# Patient Record
Sex: FEMALE | Race: WHITE | NOT HISPANIC OR LATINO | Employment: UNEMPLOYED | URBAN - METROPOLITAN AREA
[De-identification: names, ages, dates, MRNs, and addresses within clinical notes are randomized per-mention and may not be internally consistent; named-entity substitution may affect disease eponyms.]

---

## 2022-06-11 PROCEDURE — 99285 EMERGENCY DEPT VISIT HI MDM: CPT | Mod: 25

## 2022-06-11 PROCEDURE — 96375 TX/PRO/DX INJ NEW DRUG ADDON: CPT

## 2022-06-11 PROCEDURE — 96361 HYDRATE IV INFUSION ADD-ON: CPT

## 2022-06-11 PROCEDURE — 96374 THER/PROPH/DIAG INJ IV PUSH: CPT

## 2022-06-12 ENCOUNTER — HOSPITAL ENCOUNTER (EMERGENCY)
Facility: HOSPITAL | Age: 42
Discharge: HOME | End: 2022-06-12
Attending: EMERGENCY MEDICINE
Payer: COMMERCIAL

## 2022-06-12 VITALS
WEIGHT: 213.63 LBS | HEIGHT: 64 IN | BODY MASS INDEX: 36.47 KG/M2 | SYSTOLIC BLOOD PRESSURE: 108 MMHG | RESPIRATION RATE: 15 BRPM | DIASTOLIC BLOOD PRESSURE: 64 MMHG | HEART RATE: 76 BPM | OXYGEN SATURATION: 100 % | TEMPERATURE: 98 F

## 2022-06-12 DIAGNOSIS — T67.5XXA HEAT EXHAUSTION, INITIAL ENCOUNTER: Primary | ICD-10-CM

## 2022-06-12 DIAGNOSIS — E86.0 DEHYDRATION: ICD-10-CM

## 2022-06-12 DIAGNOSIS — R07.9 CHEST PAIN: ICD-10-CM

## 2022-06-12 LAB
ALBUMIN SERPL BCP-MCNC: 4 G/DL (ref 3.5–5.2)
ALP SERPL-CCNC: 76 U/L (ref 55–135)
ALT SERPL W/O P-5'-P-CCNC: 14 U/L (ref 10–44)
AMPHET+METHAMPHET UR QL: NEGATIVE
ANION GAP SERPL CALC-SCNC: 9 MMOL/L (ref 8–16)
AST SERPL-CCNC: 15 U/L (ref 10–40)
B-HCG UR QL: NEGATIVE
BARBITURATES UR QL SCN>200 NG/ML: NEGATIVE
BASOPHILS # BLD AUTO: 0.08 K/UL (ref 0–0.2)
BASOPHILS NFR BLD: 0.8 % (ref 0–1.9)
BENZODIAZ UR QL SCN>200 NG/ML: NEGATIVE
BILIRUB SERPL-MCNC: 0.5 MG/DL (ref 0.1–1)
BILIRUB UR QL STRIP: NEGATIVE
BUN SERPL-MCNC: 19 MG/DL (ref 6–20)
BZE UR QL SCN: NEGATIVE
CALCIUM SERPL-MCNC: 9.5 MG/DL (ref 8.7–10.5)
CANNABINOIDS UR QL SCN: NEGATIVE
CHLORIDE SERPL-SCNC: 109 MMOL/L (ref 95–110)
CLARITY UR: CLEAR
CO2 SERPL-SCNC: 22 MMOL/L (ref 23–29)
COLOR UR: YELLOW
CREAT SERPL-MCNC: 0.9 MG/DL (ref 0.5–1.4)
CREAT UR-MCNC: 185.8 MG/DL (ref 15–325)
DIFFERENTIAL METHOD: ABNORMAL
EOSINOPHIL # BLD AUTO: 0.2 K/UL (ref 0–0.5)
EOSINOPHIL NFR BLD: 1.7 % (ref 0–8)
ERYTHROCYTE [DISTWIDTH] IN BLOOD BY AUTOMATED COUNT: 15.2 % (ref 11.5–14.5)
EST. GFR  (AFRICAN AMERICAN): >60 ML/MIN/1.73 M^2
EST. GFR  (NON AFRICAN AMERICAN): >60 ML/MIN/1.73 M^2
GLUCOSE SERPL-MCNC: 91 MG/DL (ref 70–110)
GLUCOSE UR QL STRIP: NEGATIVE
HCT VFR BLD AUTO: 39.5 % (ref 37–48.5)
HGB BLD-MCNC: 11.9 G/DL (ref 12–16)
HGB UR QL STRIP: NEGATIVE
IMM GRANULOCYTES # BLD AUTO: 0.03 K/UL (ref 0–0.04)
IMM GRANULOCYTES NFR BLD AUTO: 0.3 % (ref 0–0.5)
KETONES UR QL STRIP: NEGATIVE
LEUKOCYTE ESTERASE UR QL STRIP: NEGATIVE
LYMPHOCYTES # BLD AUTO: 3.6 K/UL (ref 1–4.8)
LYMPHOCYTES NFR BLD: 34.9 % (ref 18–48)
MCH RBC QN AUTO: 25.9 PG (ref 27–31)
MCHC RBC AUTO-ENTMCNC: 30.1 G/DL (ref 32–36)
MCV RBC AUTO: 86 FL (ref 82–98)
METHADONE UR QL SCN>300 NG/ML: NEGATIVE
MONOCYTES # BLD AUTO: 0.6 K/UL (ref 0.3–1)
MONOCYTES NFR BLD: 5.9 % (ref 4–15)
NEUTROPHILS # BLD AUTO: 5.8 K/UL (ref 1.8–7.7)
NEUTROPHILS NFR BLD: 56.4 % (ref 38–73)
NITRITE UR QL STRIP: NEGATIVE
NRBC BLD-RTO: 0 /100 WBC
OPIATES UR QL SCN: NEGATIVE
PCP UR QL SCN>25 NG/ML: NEGATIVE
PH UR STRIP: 6 [PH] (ref 5–8)
PLATELET # BLD AUTO: 248 K/UL (ref 150–450)
PMV BLD AUTO: 11.1 FL (ref 9.2–12.9)
POTASSIUM SERPL-SCNC: 3.8 MMOL/L (ref 3.5–5.1)
PROT SERPL-MCNC: 7.8 G/DL (ref 6–8.4)
PROT UR QL STRIP: NEGATIVE
RBC # BLD AUTO: 4.59 M/UL (ref 4–5.4)
SODIUM SERPL-SCNC: 140 MMOL/L (ref 136–145)
SP GR UR STRIP: >=1.03 (ref 1–1.03)
TOXICOLOGY INFORMATION: NORMAL
TROPONIN I SERPL DL<=0.01 NG/ML-MCNC: <0.006 NG/ML (ref 0–0.03)
URN SPEC COLLECT METH UR: ABNORMAL
UROBILINOGEN UR STRIP-ACNC: NEGATIVE EU/DL
WBC # BLD AUTO: 10.27 K/UL (ref 3.9–12.7)

## 2022-06-12 PROCEDURE — 81003 URINALYSIS AUTO W/O SCOPE: CPT | Mod: 59 | Performed by: NURSE PRACTITIONER

## 2022-06-12 PROCEDURE — 80053 COMPREHEN METABOLIC PANEL: CPT | Performed by: NURSE PRACTITIONER

## 2022-06-12 PROCEDURE — 81025 URINE PREGNANCY TEST: CPT | Performed by: NURSE PRACTITIONER

## 2022-06-12 PROCEDURE — 63600175 PHARM REV CODE 636 W HCPCS: Performed by: EMERGENCY MEDICINE

## 2022-06-12 PROCEDURE — 85025 COMPLETE CBC W/AUTO DIFF WBC: CPT | Performed by: NURSE PRACTITIONER

## 2022-06-12 PROCEDURE — 84484 ASSAY OF TROPONIN QUANT: CPT | Performed by: NURSE PRACTITIONER

## 2022-06-12 PROCEDURE — 80307 DRUG TEST PRSMV CHEM ANLYZR: CPT | Performed by: NURSE PRACTITIONER

## 2022-06-12 RX ORDER — TRAZODONE HYDROCHLORIDE 50 MG/1
50 TABLET ORAL NIGHTLY
COMMUNITY
Start: 2022-05-17

## 2022-06-12 RX ORDER — KETOROLAC TROMETHAMINE 30 MG/ML
15 INJECTION, SOLUTION INTRAMUSCULAR; INTRAVENOUS
Status: COMPLETED | OUTPATIENT
Start: 2022-06-12 | End: 2022-06-12

## 2022-06-12 RX ORDER — ONDANSETRON 2 MG/ML
8 INJECTION INTRAMUSCULAR; INTRAVENOUS
Status: COMPLETED | OUTPATIENT
Start: 2022-06-12 | End: 2022-06-12

## 2022-06-12 RX ADMIN — KETOROLAC TROMETHAMINE 15 MG: 30 INJECTION, SOLUTION INTRAMUSCULAR at 01:06

## 2022-06-12 RX ADMIN — SODIUM CHLORIDE, SODIUM LACTATE, POTASSIUM CHLORIDE, AND CALCIUM CHLORIDE 1000 ML: .6; .31; .03; .02 INJECTION, SOLUTION INTRAVENOUS at 03:06

## 2022-06-12 RX ADMIN — ONDANSETRON 8 MG: 2 INJECTION INTRAMUSCULAR; INTRAVENOUS at 01:06

## 2022-06-12 NOTE — ED PROVIDER NOTES
Encounter Date: 6/11/2022       History     Chief Complaint   Patient presents with    Abdominal Pain    Headache     Pt reports that she went to [place] today and got really hot too early on in the day. Pt states that since then she is experiencing some confusion, headache, abdominal pain, chest pain, dizziness, and sensitivity to light.      Cristel Nevarez is a 41 y.o. female who presents with 6-12 hrs of gradual onset, moderate dizziness with associated confusion, headache, abdominal pain, chest pain, and aversion to light after heavy intense heat exposure in [place] all day.  She has no other complaints.           Review of patient's allergies indicates:   Allergen Reactions    Pcn [penicillins] Anaphylaxis    Rocephin [ceftriaxone] Anaphylaxis    Pfizer covid-19 vaccine (eua) [covid-19 vacc,mrna(pfizer)(pf)]     Soma [carisoprodol] Rash     Past Medical History:   Diagnosis Date    Diabetes mellitus     Migraine      Past Surgical History:   Procedure Laterality Date    CARPAL TUNNEL RELEASE      DILATION AND CURETTAGE OF UTERUS      WISDOM TOOTH EXTRACTION       No family history on file.  Social History     Tobacco Use    Smoking status: Never Smoker   Substance Use Topics    Alcohol use: No    Drug use: No     Review of Systems   Constitutional: Negative.  Negative for fever.   HENT: Negative.    Eyes: Negative.    Respiratory: Negative.  Negative for cough.    Cardiovascular: Positive for chest pain.   Gastrointestinal: Negative.    Endocrine: Negative.    Genitourinary: Negative.    Musculoskeletal: Negative.    Skin: Negative.    Allergic/Immunologic: Negative.    Neurological: Negative.    Hematological: Negative.    Psychiatric/Behavioral: Negative.    All other systems reviewed and are negative.      Physical Exam     Initial Vitals [06/12/22 0007]   BP Pulse Resp Temp SpO2   110/63 80 18 98 °F (36.7 °C) 100 %      MAP       --         Physical Exam    Nursing note and  vitals reviewed.  Constitutional: She appears well-developed and well-nourished. No distress.   HENT:   Head: Normocephalic and atraumatic.   Mucous membranes dry    Eyes: Conjunctivae and EOM are normal. Pupils are equal, round, and reactive to light.   Neck: Neck supple.   Cardiovascular: Normal rate, regular rhythm, normal heart sounds and intact distal pulses.   Pulmonary/Chest: Breath sounds normal. No respiratory distress.   Abdominal: Abdomen is soft. She exhibits no distension. There is no abdominal tenderness.   Musculoskeletal:         General: Normal range of motion.      Cervical back: Neck supple.     Neurological: She is alert and oriented to person, place, and time. She has normal strength.   Skin: Skin is warm and dry. Capillary refill takes less than 2 seconds.   Psychiatric: She has a normal mood and affect.         ED Course   Procedures  Labs Reviewed   CBC W/ AUTO DIFFERENTIAL - Abnormal; Notable for the following components:       Result Value    Hemoglobin 11.9 (*)     MCH 25.9 (*)     MCHC 30.1 (*)     RDW 15.2 (*)     All other components within normal limits   COMPREHENSIVE METABOLIC PANEL - Abnormal; Notable for the following components:    CO2 22 (*)     All other components within normal limits   URINALYSIS, REFLEX TO URINE CULTURE - Abnormal; Notable for the following components:    Specific Gravity, UA >=1.030 (*)     All other components within normal limits    Narrative:     Specimen Source->Urine   TROPONIN I   PREGNANCY TEST, URINE RAPID    Narrative:     Specimen Source->Urine   DRUG SCREEN PANEL, URINE EMERGENCY    Narrative:     Specimen Source->Urine     EKG Readings: (Independently Interpreted)   EKG Interpretation by Emergency Physician: Lavell Velasquez MD  Rhythm: normal sinus  Rate: 66 bpm  No ST-T changes concerning for acute ischemia  Normal axis.   Normal intervals.           Imaging Results          X-Ray Chest PA And Lateral (In process)             Chest X-Ray,  Emergency Physician Interpretation: No acute pathology      Medications   lactated ringers bolus 1,000 mL (has no administration in time range)   ondansetron injection 8 mg (8 mg Intravenous Given 6/12/22 0151)   ketorolac injection 15 mg (15 mg Intravenous Given 6/12/22 0151)                     Recent Results (from the past 24 hour(s))   CBC auto differential    Collection Time: 06/12/22  1:37 AM   Result Value Ref Range    WBC 10.27 3.90 - 12.70 K/uL    RBC 4.59 4.00 - 5.40 M/uL    Hemoglobin 11.9 (L) 12.0 - 16.0 g/dL    Hematocrit 39.5 37.0 - 48.5 %    MCV 86 82 - 98 fL    MCH 25.9 (L) 27.0 - 31.0 pg    MCHC 30.1 (L) 32.0 - 36.0 g/dL    RDW 15.2 (H) 11.5 - 14.5 %    Platelets 248 150 - 450 K/uL    MPV 11.1 9.2 - 12.9 fL    Immature Granulocytes 0.3 0.0 - 0.5 %    Gran # (ANC) 5.8 1.8 - 7.7 K/uL    Immature Grans (Abs) 0.03 0.00 - 0.04 K/uL    Lymph # 3.6 1.0 - 4.8 K/uL    Mono # 0.6 0.3 - 1.0 K/uL    Eos # 0.2 0.0 - 0.5 K/uL    Baso # 0.08 0.00 - 0.20 K/uL    nRBC 0 0 /100 WBC    Gran % 56.4 38.0 - 73.0 %    Lymph % 34.9 18.0 - 48.0 %    Mono % 5.9 4.0 - 15.0 %    Eosinophil % 1.7 0.0 - 8.0 %    Basophil % 0.8 0.0 - 1.9 %    Differential Method Automated    Comprehensive metabolic panel    Collection Time: 06/12/22  1:37 AM   Result Value Ref Range    Sodium 140 136 - 145 mmol/L    Potassium 3.8 3.5 - 5.1 mmol/L    Chloride 109 95 - 110 mmol/L    CO2 22 (L) 23 - 29 mmol/L    Glucose 91 70 - 110 mg/dL    BUN 19 6 - 20 mg/dL    Creatinine 0.9 0.5 - 1.4 mg/dL    Calcium 9.5 8.7 - 10.5 mg/dL    Total Protein 7.8 6.0 - 8.4 g/dL    Albumin 4.0 3.5 - 5.2 g/dL    Total Bilirubin 0.5 0.1 - 1.0 mg/dL    Alkaline Phosphatase 76 55 - 135 U/L    AST 15 10 - 40 U/L    ALT 14 10 - 44 U/L    Anion Gap 9 8 - 16 mmol/L    eGFR if African American >60 >60 mL/min/1.73 m^2    eGFR if non African American >60 >60 mL/min/1.73 m^2   Troponin I    Collection Time: 06/12/22  1:37 AM   Result Value Ref Range    Troponin I <0.006 0.000 -  0.026 ng/mL   Urinalysis, Reflex to Urine Culture Urine, Clean Catch    Collection Time: 06/12/22  1:45 AM    Specimen: Urine   Result Value Ref Range    Specimen UA Urine, Clean Catch     Color, UA Yellow Yellow, Straw, Ardi    Appearance, UA Clear Clear    pH, UA 6.0 5.0 - 8.0    Specific Gravity, UA >=1.030 (A) 1.005 - 1.030    Protein, UA Negative Negative    Glucose, UA Negative Negative    Ketones, UA Negative Negative    Bilirubin (UA) Negative Negative    Occult Blood UA Negative Negative    Nitrite, UA Negative Negative    Urobilinogen, UA Negative <2.0 EU/dL    Leukocytes, UA Negative Negative   Pregnancy, urine rapid    Collection Time: 06/12/22  1:45 AM   Result Value Ref Range    Preg Test, Ur Negative    Drug screen panel, emergency    Collection Time: 06/12/22  1:45 AM   Result Value Ref Range    Benzodiazepines Negative Negative    Methadone metabolites Negative Negative    Cocaine (Metab.) Negative Negative    Opiate Scrn, Ur Negative Negative    Barbiturate Screen, Ur Negative Negative    Amphetamine Screen, Ur Negative Negative    THC Negative Negative    Phencyclidine Negative Negative    Creatinine, Urine 185.8 15.0 - 325.0 mg/dL    Toxicology Information SEE COMMENT        Patient's evaluation in the ED does not suggest any emergent or life threatening medical conditions requiring immediate intervention beyond what was provided in the ED, and I believe patient is safe for discharge.  Regardless, an unremarkable evaluation in the ED does not preclude the development or presence of a serious or life threatening condition. As such, patient was given return instructions for any change or worsening in symptoms.         Clinical Impression:   Final diagnoses:  [R07.9] Chest pain  [E86.0] Dehydration  [T67.5XXA] Heat exhaustion, initial encounter (Primary)          ED Disposition Condition    Discharge Stable        ED Prescriptions     None        Follow-up Information     Follow up With Specialties  Details Why Contact Info    with your primary care physician  Schedule an appointment as soon as possible for a visit       [facility] - Emergency Dept. Emergency Medicine  As needed, If symptoms worsen [address] [facility] [address] [phone]           Lavell Velasquez MD  06/12/22 3569

## 2022-06-12 NOTE — FIRST PROVIDER EVALUATION
"Medical screening exam completed.  I have conducted a focused provider triage encounter, findings are as follows:    Brief history of present illness:  Pt presents with c/o chest pain, shortness of breath, abdominal pain and nausea.  Was in the heat in [place] this morning when symptoms started.    Vitals:    06/12/22 0007   BP: 110/63   Pulse: 80   Resp: 18   Temp: 98 °F (36.7 °C)   TempSrc: Oral   SpO2: 100%   Weight: 96.9 kg (213 lb 10 oz)   Height: 5' 4" (1.626 m)       Pertinent physical exam:      Brief workup plan:      Preliminary workup initiated; this workup will be continued and followed by the physician or advanced practice provider that is assigned to the patient when roomed.  "

## 2023-04-04 LAB
ALBUMIN SERPL BCP-MCNC: 3.7 G/DL (ref 3.5–5.2)
ALP SERPL-CCNC: 97 U/L (ref 55–135)
ALT SERPL W/O P-5'-P-CCNC: 19 U/L (ref 10–44)
ANION GAP SERPL CALC-SCNC: 10 MMOL/L (ref 8–16)
AST SERPL-CCNC: 17 U/L (ref 10–40)
BASOPHILS # BLD AUTO: 0.07 K/UL (ref 0–0.2)
BASOPHILS NFR BLD: 0.8 % (ref 0–1.9)
BILIRUB SERPL-MCNC: 0.3 MG/DL (ref 0.1–1)
BUN SERPL-MCNC: 10 MG/DL (ref 6–20)
CALCIUM SERPL-MCNC: 9 MG/DL (ref 8.7–10.5)
CHLORIDE SERPL-SCNC: 110 MMOL/L (ref 95–110)
CO2 SERPL-SCNC: 22 MMOL/L (ref 23–29)
CREAT SERPL-MCNC: 0.8 MG/DL (ref 0.5–1.4)
DIFFERENTIAL METHOD: ABNORMAL
EOSINOPHIL # BLD AUTO: 0.2 K/UL (ref 0–0.5)
EOSINOPHIL NFR BLD: 2 % (ref 0–8)
ERYTHROCYTE [DISTWIDTH] IN BLOOD BY AUTOMATED COUNT: 15.9 % (ref 11.5–14.5)
EST. GFR  (NO RACE VARIABLE): >60 ML/MIN/1.73 M^2
GLUCOSE SERPL-MCNC: 96 MG/DL (ref 70–110)
HCT VFR BLD AUTO: 38.2 % (ref 37–48.5)
HGB BLD-MCNC: 12 G/DL (ref 12–16)
IMM GRANULOCYTES # BLD AUTO: 0.03 K/UL (ref 0–0.04)
IMM GRANULOCYTES NFR BLD AUTO: 0.3 % (ref 0–0.5)
LYMPHOCYTES # BLD AUTO: 3.1 K/UL (ref 1–4.8)
LYMPHOCYTES NFR BLD: 34.5 % (ref 18–48)
MCH RBC QN AUTO: 27.3 PG (ref 27–31)
MCHC RBC AUTO-ENTMCNC: 31.4 G/DL (ref 32–36)
MCV RBC AUTO: 87 FL (ref 82–98)
MONOCYTES # BLD AUTO: 0.6 K/UL (ref 0.3–1)
MONOCYTES NFR BLD: 7 % (ref 4–15)
NEUTROPHILS # BLD AUTO: 4.9 K/UL (ref 1.8–7.7)
NEUTROPHILS NFR BLD: 55.4 % (ref 38–73)
NRBC BLD-RTO: 0 /100 WBC
PLATELET # BLD AUTO: 264 K/UL (ref 150–450)
PMV BLD AUTO: 10.3 FL (ref 9.2–12.9)
POTASSIUM SERPL-SCNC: 4.4 MMOL/L (ref 3.5–5.1)
PROT SERPL-MCNC: 7.1 G/DL (ref 6–8.4)
RBC # BLD AUTO: 4.39 M/UL (ref 4–5.4)
SODIUM SERPL-SCNC: 142 MMOL/L (ref 136–145)
WBC # BLD AUTO: 8.92 K/UL (ref 3.9–12.7)

## 2023-04-04 PROCEDURE — 96375 TX/PRO/DX INJ NEW DRUG ADDON: CPT

## 2023-04-04 PROCEDURE — 80053 COMPREHEN METABOLIC PANEL: CPT | Performed by: PHYSICIAN ASSISTANT

## 2023-04-04 PROCEDURE — 96374 THER/PROPH/DIAG INJ IV PUSH: CPT

## 2023-04-04 PROCEDURE — 99285 EMERGENCY DEPT VISIT HI MDM: CPT | Mod: 25

## 2023-04-04 PROCEDURE — 85025 COMPLETE CBC W/AUTO DIFF WBC: CPT | Performed by: PHYSICIAN ASSISTANT

## 2023-04-04 PROCEDURE — 63600175 PHARM REV CODE 636 W HCPCS

## 2023-04-04 RX ORDER — KETOROLAC TROMETHAMINE 30 MG/ML
15 INJECTION, SOLUTION INTRAMUSCULAR; INTRAVENOUS
Status: COMPLETED | OUTPATIENT
Start: 2023-04-04 | End: 2023-04-04

## 2023-04-04 RX ORDER — ONDANSETRON 2 MG/ML
4 INJECTION INTRAMUSCULAR; INTRAVENOUS
Status: COMPLETED | OUTPATIENT
Start: 2023-04-04 | End: 2023-04-04

## 2023-04-04 RX ORDER — KETOROLAC TROMETHAMINE 30 MG/ML
INJECTION, SOLUTION INTRAMUSCULAR; INTRAVENOUS
Status: COMPLETED
Start: 2023-04-04 | End: 2023-04-04

## 2023-04-04 RX ORDER — ONDANSETRON 2 MG/ML
INJECTION INTRAMUSCULAR; INTRAVENOUS
Status: COMPLETED
Start: 2023-04-04 | End: 2023-04-04

## 2023-04-04 RX ADMIN — ONDANSETRON 4 MG: 2 INJECTION INTRAMUSCULAR; INTRAVENOUS at 09:04

## 2023-04-04 RX ADMIN — KETOROLAC TROMETHAMINE 15 MG: 30 INJECTION, SOLUTION INTRAMUSCULAR; INTRAVENOUS at 09:04

## 2023-04-05 ENCOUNTER — HOSPITAL ENCOUNTER (EMERGENCY)
Facility: HOSPITAL | Age: 43
Discharge: HOME | End: 2023-04-05
Attending: EMERGENCY MEDICINE
Payer: COMMERCIAL

## 2023-04-05 VITALS
RESPIRATION RATE: 20 BRPM | DIASTOLIC BLOOD PRESSURE: 65 MMHG | WEIGHT: 230 LBS | HEIGHT: 64 IN | TEMPERATURE: 98 F | OXYGEN SATURATION: 97 % | BODY MASS INDEX: 39.27 KG/M2 | HEART RATE: 99 BPM | SYSTOLIC BLOOD PRESSURE: 139 MMHG

## 2023-04-05 DIAGNOSIS — R10.32 LEFT LOWER QUADRANT ABDOMINAL PAIN: Primary | ICD-10-CM

## 2023-04-05 LAB
B-HCG UR QL: NEGATIVE
BILIRUB UR QL STRIP: NEGATIVE
CLARITY UR: CLEAR
COLOR UR: YELLOW
GLUCOSE UR QL STRIP: NEGATIVE
HGB UR QL STRIP: NEGATIVE
KETONES UR QL STRIP: NEGATIVE
LEUKOCYTE ESTERASE UR QL STRIP: NEGATIVE
NITRITE UR QL STRIP: NEGATIVE
PH UR STRIP: 6 [PH] (ref 5–8)
PROT UR QL STRIP: ABNORMAL
SP GR UR STRIP: >1.03 (ref 1–1.03)
URN SPEC COLLECT METH UR: ABNORMAL
UROBILINOGEN UR STRIP-ACNC: NEGATIVE EU/DL

## 2023-04-05 PROCEDURE — 63600175 PHARM REV CODE 636 W HCPCS: Performed by: EMERGENCY MEDICINE

## 2023-04-05 PROCEDURE — 96372 THER/PROPH/DIAG INJ SC/IM: CPT | Performed by: EMERGENCY MEDICINE

## 2023-04-05 PROCEDURE — 81025 URINE PREGNANCY TEST: CPT | Performed by: PHYSICIAN ASSISTANT

## 2023-04-05 PROCEDURE — 81003 URINALYSIS AUTO W/O SCOPE: CPT | Performed by: PHYSICIAN ASSISTANT

## 2023-04-05 RX ORDER — MORPHINE SULFATE 10 MG/ML
10 INJECTION INTRAMUSCULAR; INTRAVENOUS; SUBCUTANEOUS
Status: COMPLETED | OUTPATIENT
Start: 2023-04-05 | End: 2023-04-05

## 2023-04-05 RX ORDER — DIPHENHYDRAMINE HYDROCHLORIDE 50 MG/ML
25 INJECTION INTRAMUSCULAR; INTRAVENOUS
Status: DISCONTINUED | OUTPATIENT
Start: 2023-04-05 | End: 2023-04-05 | Stop reason: HOSPADM

## 2023-04-05 RX ADMIN — MORPHINE SULFATE 10 MG: 10 INJECTION, SOLUTION INTRAMUSCULAR; INTRAVENOUS at 02:04

## 2023-04-05 NOTE — ED PROVIDER NOTES
"SCRIBE #1 NOTE: I, Destin Olson and Vijay Rodríguez, am scribing for, and in the presence of, Mayank Eller MD. I have scribed the entire note.       History     Chief Complaint   Patient presents with    Abdominal Pain     Sudden severe LLQ pain, states keeping pressure on the site helps a little, hx of ovarian cysts. States she was fine two hours ago and takes medications to "stop hemorrhaging during her cycle" (ended last week) some abdominal discomfort recently.      Review of patient's allergies indicates:   Allergen Reactions    Pcn [penicillins] Anaphylaxis    Rocephin [ceftriaxone] Anaphylaxis    Pfizer covid-19 vaccine (eua) [covid-19 vacc,mrna(pfizer)(pf)]     Influenza virus vaccines Other (See Comments)     Severe flu like symptoms - intense generalized myalgias, malaise and fatigue  No site reaction, rash/hives, lip/tongue/throat swelling     Soma [carisoprodol] Rash         History of Present Illness     HPI    4/5/2023, 1:49 AM  History obtained from the patient      History of Present Illness: Cristel Nevarez is a 42 y.o. female patient with a PMHx of DM who presents to the Emergency Department for evaluation of LLQ pain which onset 4 hours PTA. Symptoms are constant and severe. Exacerbating factors include moving and laying down. No associated sxs reported. Patient denies any fever, n/v, CP, SOB, HA, and all other sxs at this time. Prior Tx includes Aleve with no relief. No further complaints or concerns at this time.       Arrival mode: Personal vehicle    PCP: Primary Doctor No        Past Medical History:  Past Medical History:   Diagnosis Date    Diabetes mellitus     Migraine        Past Surgical History:  Past Surgical History:   Procedure Laterality Date    CARPAL TUNNEL RELEASE      DILATION AND CURETTAGE OF UTERUS      WISDOM TOOTH EXTRACTION           Family History:  No family history on file.    Social History:  Social History     Tobacco Use    Smoking status: Never    Smokeless " tobacco: Not on file   Substance and Sexual Activity    Alcohol use: No    Drug use: No    Sexual activity: Not on file        Review of Systems     Review of Systems   Constitutional:  Negative for fever.   HENT:  Negative for sore throat.    Respiratory:  Negative for shortness of breath.    Cardiovascular:  Negative for chest pain.   Gastrointestinal:  Positive for abdominal pain (LLQ). Negative for nausea and vomiting.   Genitourinary:  Negative for dysuria.   Musculoskeletal:  Negative for back pain.   Skin:  Negative for rash.   Neurological:  Negative for weakness and headaches.   Hematological:  Does not bruise/bleed easily.   All other systems reviewed and are negative.     Physical Exam     Initial Vitals [04/04/23 2125]   BP Pulse Resp Temp SpO2   139/65 99 (!) 22 98.1 °F (36.7 °C) 97 %      MAP       --          Physical Exam  Nursing Notes and Vital Signs Reviewed.  Constitutional: Patient is in moderate distress. Well-developed and well-nourished.  Head: Atraumatic. Normocephalic.  Eyes: PERRL. EOM intact. Conjunctivae are not pale. No scleral icterus.  ENT: Mucous membranes are moist. Oropharynx is clear and symmetric.    Neck: Supple. Full ROM. No lymphadenopathy.  Cardiovascular: Regular rate. Regular rhythm. No murmurs, rubs, or gallops. Distal pulses are 2+ and symmetric.  Pulmonary/Chest: No respiratory distress. Clear to auscultation bilaterally. No wheezing or rales.  Abdominal: Soft and non-distended.  There is LLQ tenderness.  No rebound, guarding, or rigidity. Good bowel sounds.  Genitourinary: No CVA tenderness  Musculoskeletal: Moves all extremities. No obvious deformities. No edema. No calf tenderness.  Skin: Warm and dry.  Neurological:  Alert, awake, and appropriate.  Normal speech.  No acute focal neurological deficits are appreciated.  Psychiatric: Normal affect. Good eye contact. Appropriate in content.     ED Course   Procedures  ED Vital Signs:  Vitals:    04/04/23 2125 04/05/23  "0214   BP: 139/65    Pulse: 99    Resp: (!) 22 20   Temp: 98.1 °F (36.7 °C)    TempSrc: Oral    SpO2: 97%    Weight: 104.3 kg (230 lb)    Height: 5' 4" (1.626 m)        Abnormal Lab Results:  Labs Reviewed   CBC W/ AUTO DIFFERENTIAL - Abnormal; Notable for the following components:       Result Value    MCHC 31.4 (*)     RDW 15.9 (*)     All other components within normal limits   COMPREHENSIVE METABOLIC PANEL - Abnormal; Notable for the following components:    CO2 22 (*)     All other components within normal limits   URINALYSIS - Abnormal; Notable for the following components:    Specific Gravity, UA >1.030 (*)     Protein, UA Trace (*)     All other components within normal limits   PREGNANCY TEST, URINE RAPID    Narrative:     Specimen Source->Urine        All Lab Results:  Results for orders placed or performed during the hospital encounter of 04/05/23   CBC auto differential   Result Value Ref Range    WBC 8.92 3.90 - 12.70 K/uL    RBC 4.39 4.00 - 5.40 M/uL    Hemoglobin 12.0 12.0 - 16.0 g/dL    Hematocrit 38.2 37.0 - 48.5 %    MCV 87 82 - 98 fL    MCH 27.3 27.0 - 31.0 pg    MCHC 31.4 (L) 32.0 - 36.0 g/dL    RDW 15.9 (H) 11.5 - 14.5 %    Platelets 264 150 - 450 K/uL    MPV 10.3 9.2 - 12.9 fL    Immature Granulocytes 0.3 0.0 - 0.5 %    Gran # (ANC) 4.9 1.8 - 7.7 K/uL    Immature Grans (Abs) 0.03 0.00 - 0.04 K/uL    Lymph # 3.1 1.0 - 4.8 K/uL    Mono # 0.6 0.3 - 1.0 K/uL    Eos # 0.2 0.0 - 0.5 K/uL    Baso # 0.07 0.00 - 0.20 K/uL    nRBC 0 0 /100 WBC    Gran % 55.4 38.0 - 73.0 %    Lymph % 34.5 18.0 - 48.0 %    Mono % 7.0 4.0 - 15.0 %    Eosinophil % 2.0 0.0 - 8.0 %    Basophil % 0.8 0.0 - 1.9 %    Differential Method Automated    Comprehensive metabolic panel   Result Value Ref Range    Sodium 142 136 - 145 mmol/L    Potassium 4.4 3.5 - 5.1 mmol/L    Chloride 110 95 - 110 mmol/L    CO2 22 (L) 23 - 29 mmol/L    Glucose 96 70 - 110 mg/dL    BUN 10 6 - 20 mg/dL    Creatinine 0.8 0.5 - 1.4 mg/dL    Calcium 9.0 8.7 " - 10.5 mg/dL    Total Protein 7.1 6.0 - 8.4 g/dL    Albumin 3.7 3.5 - 5.2 g/dL    Total Bilirubin 0.3 0.1 - 1.0 mg/dL    Alkaline Phosphatase 97 55 - 135 U/L    AST 17 10 - 40 U/L    ALT 19 10 - 44 U/L    Anion Gap 10 8 - 16 mmol/L    eGFR >60 >60 mL/min/1.73 m^2   Pregnancy, urine rapid (UPT)   Result Value Ref Range    Preg Test, Ur Negative    Urinalysis   Result Value Ref Range    Specimen UA Urine, Clean Catch     Color, UA Yellow Yellow, Straw, Adri    Appearance, UA Clear Clear    pH, UA 6.0 5.0 - 8.0    Specific Gravity, UA >1.030 (A) 1.005 - 1.030    Protein, UA Trace (A) Negative    Glucose, UA Negative Negative    Ketones, UA Negative Negative    Bilirubin (UA) Negative Negative    Occult Blood UA Negative Negative    Nitrite, UA Negative Negative    Urobilinogen, UA Negative <2.0 EU/dL    Leukocytes, UA Negative Negative        Imaging Results:  Imaging Results              CT Abdomen Pelvis  Without Contrast (Final result)  Result time 04/05/23 07:21:43      Final result by Gatito Johnston MD (04/05/23 07:21:43)                   Impression:      1. No acute finding in the abdomen or pelvis.  2. Probable hemorrhagic or proteinaceous cyst in the right kidney.      Electronically signed by: Gatito Johnston  Date:    04/05/2023  Time:    07:21               Narrative:    EXAMINATION:  CT ABDOMEN PELVIS WITHOUT CONTRAST    CLINICAL HISTORY:  Abdominal pain, acute, nonlocalized;    COMPARISON:  None available.    TECHNIQUE:  Axial CT images were obtained of the abdomen and pelvis.  Iterative reconstruction technique was used. The CT exam was performed using one or more of the following dose reduction techniques- Automated exposure control, adjustment of the mA and/or kV according to patient size, and/or use of iterative reconstructed technique.    FINDINGS:  Heart: Normal in size. No pericardial effusion.    Lung Bases: Well aerated, without consolidation or pleural fluid.    Liver: Normal in size and  attenuation, with no focal hepatic lesions.    Gallbladder: No calcified gallstones.    Bile Ducts: No evidence of dilated ducts.    Pancreas: No mass or peripancreatic fat stranding.    Spleen: Unremarkable.    Adrenals: Unremarkable.    Kidneys/ Ureters: Right-sided renal lesion demonstrates high attenuation potentially reflecting hemorrhagic cyst measuring 1.4 cm.  There is an adjacent dystrophic calcification.  No hydronephrosis on either side.    Bladder: No evidence of wall thickening.    Reproductive organs: Unremarkable.    GI Tract/Mesentery: No evidence of bowel obstruction or inflammation.    Peritoneal Space: No ascites. No free air.    Retroperitoneum: No significant adenopathy.    Abdominal wall: Unremarkable.    Vasculature: No significant atherosclerosis or aneurysm.    Bones: No acute fracture.                                       US Pelvis Comp with Transvag NON-OB (xpd) (Final result)  Result time 04/04/23 23:00:04   Procedure changed from US Pelvis Complete Non OB     Final result by Sreedhar Del Valle MD (04/04/23 23:00:04)                   Impression:      Suboptimal evaluation.  Nonvisualization of the right ovary.    Left ovary is normal sized but with simple appearing cystic lesion. Venous flow cannot be established. Limited arterial flow identified. Torsion not excluded.  Correlation to symptoms and further evaluation as warranted.      Electronically signed by: Sreedhar Del Valle  Date:    04/04/2023  Time:    23:00               Narrative:    EXAMINATION:  US PELVIS COMP WITH TRANSVAG NON-OB (XPD)    CLINICAL HISTORY:  pelvic pain;    TECHNIQUE:  Transabdominal sonography of the pelvis was performed, followed by transvaginal sonography to better evaluate the uterus and ovaries.    COMPARISON:  None.    FINDINGS:  Uterus:    Size: 10.2 x 4.1 x 4.8 cm    Masses: 2.5 x 2.8 x 2.8 cm fibroid.    Endometrium: Normal in this pre menopausal patient, measuring 8 mm.    Right  ovary:    Nonvisualized    Left ovary:    Size: 3.2 x 3.2 x 2.2 cm    Appearance: Ovaries poorly visualized.  There is an associated 2.6 cm cyst.    Vascular Flow: Venous flow cannot be established.  Limited arterial flow identified.  Torsion not excluded.    Free Fluid:    None.                                     Type of Interpretation: Outside Written Report.  Radiology Procedure Done: Abdomen/Pelvis CT without Contrast.  Interpretation: No acute findings in the abdomen or pelvis.                   The Emergency Provider reviewed the vital signs and test results, which are outlined above.     ED Discussion     2:05 AM: Discussed pt's case with Dr. Walker (OBGYN) who says don't get torsion with a normal sized ovary.    3:36 AM: Reassessed pt at this time. Discussed with pt all pertinent ED information and results. Discussed pt dx and plan of tx. Gave pt all f/u and return to the ED instructions. All questions and concerns were addressed at this time. Pt expresses understanding of information and instructions, and is comfortable with plan to discharge. Pt is stable for discharge.    I discussed with patient and/or family/caretaker that evaluation in the ED does not suggest any emergent or life threatening medical conditions requiring immediate intervention beyond what was provided in the ED, and I believe patient is safe for discharge.  Regardless, an unremarkable evaluation in the ED does not preclude the development or presence of a serious of life threatening condition. As such, patient was instructed to return immediately for any worsening or change in current symptoms.         Medical Decision Making:   Clinical Tests:   Lab Tests: Ordered and Reviewed  Radiological Study: Ordered and Reviewed         ED Medication(s):  Medications   ketorolac injection 15 mg (15 mg Intravenous Given 4/4/23 2142)   ondansetron injection 4 mg (4 mg Intravenous Given 4/4/23 2142)   morphine injection 10 mg (10 mg Intramuscular  Given 4/5/23 0214)       Discharge Medication List as of 4/5/2023  3:38 AM           Follow-up Information       Michell Arzate MD In 1 day.    Specialty: Internal Medicine  Contact information:  [address] [facility]  [address]  [facility] [address] [phone]               [facility] - Emergency Dept..    Specialty: Emergency Medicine  Why: As needed, If symptoms worsen  Contact information:  [address] [facility] [address] [phone]                               Scribe Attestation:   Scribe #1: I performed the above scribed service and the documentation accurately describes the services I performed. I attest to the accuracy of the note.     Attending:   Physician Attestation Statement for Scribe #1: I, Mayank Eller MD, personally performed the services described in this documentation, as scribed by Destin Olson and Vijay Rodríguez, in my presence, and it is both accurate and complete.           Clinical Impression       ICD-10-CM ICD-9-CM   1. Left lower quadrant abdominal pain  R10.32 789.04       Disposition:   Disposition: Discharged  Condition: Stable       Mayank Eller MD  04/06/23 7193

## 2023-04-05 NOTE — FIRST PROVIDER EVALUATION
" Emergency Department TeleTriage Encounter Note      CHIEF COMPLAINT    Chief Complaint   Patient presents with    Abdominal Pain     Sudden severe LLQ pain, states keeping pressure on the site helps a little, hx of ovarian cysts. States she was fine two hours ago and takes medications to "stop hemorrhaging during her cycle" (ended last week) some abdominal discomfort recently.        VITAL SIGNS   Initial Vitals [04/04/23 2125]   BP Pulse Resp Temp SpO2   139/65 99 (!) 22 98.1 °F (36.7 °C) 97 %      MAP       --            ALLERGIES    Review of patient's allergies indicates:   Allergen Reactions    Pcn [penicillins] Anaphylaxis    Rocephin [ceftriaxone] Anaphylaxis    Pfizer covid-19 vaccine (eua) [covid-19 vacc,mrna(pfizer)(pf)]     Influenza virus vaccines Other (See Comments)     Severe flu like symptoms - intense generalized myalgias, malaise and fatigue  No site reaction, rash/hives, lip/tongue/throat swelling     Soma [carisoprodol] Rash       PROVIDER TRIAGE NOTE  This is a teletriage evaluation of a 42 y.o. female presenting to the ED complaining of left suprapubic pain that started suddenly while in shower.  Hx of ovarian cyst.     Initial orders will be placed and care will be transferred to an alternate provider when patient is roomed for a full evaluation. Any additional orders and the final disposition will be determined by that provider.       ORDERS  Labs Reviewed   CBC W/ AUTO DIFFERENTIAL   COMPREHENSIVE METABOLIC PANEL   PREGNANCY TEST, URINE RAPID   URINALYSIS       ED Orders (720h ago, onward)      Start Ordered     Status Ordering Provider    04/04/23 2145 04/04/23 2133  ketorolac injection 15 mg  ED 1 Time         Acknowledged DANIEL ELY    04/04/23 2145 04/04/23 2133  ondansetron injection 4 mg  ED 1 Time         Acknowledged DANIEL ELY    04/04/23 2135 04/04/23 2134  US Pelvis Complete Non OB  1 time imaging         Ordered DANIEL ELY    " 04/04/23 2133 04/04/23 2133  Saline lock IV  Once         Completed by DAYNE VAZQUEZ on 4/4/2023 at  9:34 PM DANIEL ELY    04/04/23 2133 04/04/23 2133  CBC auto differential  STAT         Acknowledged DANIEL ELY    04/04/23 2133 04/04/23 2133  Comprehensive metabolic panel  STAT         Acknowledged DANIEL ELY    04/04/23 2133 04/04/23 2133  Pregnancy, urine rapid (UPT)  Once         Acknowledged DANIEL ELY    04/04/23 2133 04/04/23 2133  Urinalysis  STAT         Acknowledged DANIEL ELY              Virtual Visit Note: The provider triage portion of this emergency department evaluation and documentation was performed via [employer], a HIPAA-compliant telemedicine application, in concert with a tele-presenter in the room. A face to face patient evaluation with one of my colleagues will occur once the patient is placed in an emergency department room.      DISCLAIMER: This note was prepared with [employer] voice recognition transcription software. Garbled syntax, mangled pronouns, and other bizarre constructions may be attributed to that software system.

## 2023-11-20 ENCOUNTER — TELEPHONE (OUTPATIENT)
Dept: SPORTS MEDICINE | Facility: CLINIC | Age: 43
End: 2023-11-20
Payer: COMMERCIAL

## 2023-11-20 DIAGNOSIS — M25.532 LEFT WRIST PAIN: Primary | ICD-10-CM

## 2023-11-22 ENCOUNTER — HOSPITAL ENCOUNTER (OUTPATIENT)
Dept: RADIOLOGY | Facility: HOSPITAL | Age: 43
Discharge: HOME | End: 2023-11-22
Attending: STUDENT IN AN ORGANIZED HEALTH CARE EDUCATION/TRAINING PROGRAM
Payer: COMMERCIAL

## 2023-11-22 ENCOUNTER — OFFICE VISIT (OUTPATIENT)
Dept: SPORTS MEDICINE | Facility: CLINIC | Age: 43
End: 2023-11-22
Payer: COMMERCIAL

## 2023-11-22 DIAGNOSIS — M25.532 LEFT WRIST PAIN: ICD-10-CM

## 2023-11-22 DIAGNOSIS — S63.502A SPRAIN AND STRAIN OF LEFT WRIST: ICD-10-CM

## 2023-11-22 DIAGNOSIS — S52.592A OTHER CLOSED FRACTURE OF DISTAL END OF LEFT RADIUS, INITIAL ENCOUNTER: Primary | ICD-10-CM

## 2023-11-22 DIAGNOSIS — S66.912A SPRAIN AND STRAIN OF LEFT WRIST: ICD-10-CM

## 2023-11-22 PROCEDURE — 73110 X-RAY EXAM OF WRIST: CPT | Mod: TC,FY,PO,LT

## 2023-11-22 PROCEDURE — 73110 X-RAY EXAM OF WRIST: CPT | Mod: 26,LT,, | Performed by: RADIOLOGY

## 2023-11-22 PROCEDURE — 25600 CLTX DST RDL FX/EPHYS SEP WO: CPT | Mod: LT,S$GLB,, | Performed by: STUDENT IN AN ORGANIZED HEALTH CARE EDUCATION/TRAINING PROGRAM

## 2023-11-22 PROCEDURE — 1159F MED LIST DOCD IN RCRD: CPT | Mod: CPTII,S$GLB,, | Performed by: STUDENT IN AN ORGANIZED HEALTH CARE EDUCATION/TRAINING PROGRAM

## 2023-11-22 PROCEDURE — 73110 XR WRIST COMPLETE 3 VIEWS LEFT: ICD-10-PCS | Mod: 26,LT,, | Performed by: RADIOLOGY

## 2023-11-22 PROCEDURE — 1160F RVW MEDS BY RX/DR IN RCRD: CPT | Mod: CPTII,S$GLB,, | Performed by: STUDENT IN AN ORGANIZED HEALTH CARE EDUCATION/TRAINING PROGRAM

## 2023-11-22 PROCEDURE — 99999 PR PBB SHADOW E&M-EST. PATIENT-LVL III: CPT | Mod: PBBFAC,,, | Performed by: STUDENT IN AN ORGANIZED HEALTH CARE EDUCATION/TRAINING PROGRAM

## 2023-11-22 PROCEDURE — 25600 PR CLOSED RX DIST RAD/ULNA FX: ICD-10-PCS | Mod: LT,S$GLB,, | Performed by: STUDENT IN AN ORGANIZED HEALTH CARE EDUCATION/TRAINING PROGRAM

## 2023-11-22 PROCEDURE — 99204 OFFICE O/P NEW MOD 45 MIN: CPT | Mod: 57,S$GLB,, | Performed by: STUDENT IN AN ORGANIZED HEALTH CARE EDUCATION/TRAINING PROGRAM

## 2023-11-22 PROCEDURE — 99999 PR PBB SHADOW E&M-EST. PATIENT-LVL III: ICD-10-PCS | Mod: PBBFAC,,, | Performed by: STUDENT IN AN ORGANIZED HEALTH CARE EDUCATION/TRAINING PROGRAM

## 2023-11-22 PROCEDURE — 1159F PR MEDICATION LIST DOCUMENTED IN MEDICAL RECORD: ICD-10-PCS | Mod: CPTII,S$GLB,, | Performed by: STUDENT IN AN ORGANIZED HEALTH CARE EDUCATION/TRAINING PROGRAM

## 2023-11-22 PROCEDURE — 99204 PR OFFICE/OUTPT VISIT, NEW, LEVL IV, 45-59 MIN: ICD-10-PCS | Mod: 57,S$GLB,, | Performed by: STUDENT IN AN ORGANIZED HEALTH CARE EDUCATION/TRAINING PROGRAM

## 2023-11-22 PROCEDURE — 1160F PR REVIEW ALL MEDS BY PRESCRIBER/CLIN PHARMACIST DOCUMENTED: ICD-10-PCS | Mod: CPTII,S$GLB,, | Performed by: STUDENT IN AN ORGANIZED HEALTH CARE EDUCATION/TRAINING PROGRAM

## 2023-11-22 RX ORDER — NEBIVOLOL 10 MG/1
1 TABLET ORAL DAILY
COMMUNITY
Start: 2023-08-04

## 2023-11-22 RX ORDER — VALACYCLOVIR HYDROCHLORIDE 1 G/1
TABLET, FILM COATED ORAL
COMMUNITY
Start: 2023-05-02

## 2023-11-22 RX ORDER — RIZATRIPTAN BENZOATE 10 MG/1
10 TABLET, ORALLY DISINTEGRATING ORAL DAILY PRN
COMMUNITY
Start: 2023-05-02 | End: 2024-10-03

## 2023-11-22 RX ORDER — ALPRAZOLAM 0.5 MG/1
TABLET ORAL
COMMUNITY

## 2023-11-22 RX ORDER — ONDANSETRON 8 MG/1
8 TABLET, ORALLY DISINTEGRATING ORAL EVERY 12 HOURS PRN
COMMUNITY

## 2023-11-22 RX ORDER — ESCITALOPRAM OXALATE 10 MG/1
10 TABLET ORAL EVERY MORNING
COMMUNITY
Start: 2023-06-07

## 2023-11-22 RX ORDER — HYDROXYZINE HYDROCHLORIDE 25 MG/1
TABLET, FILM COATED ORAL
COMMUNITY
Start: 2023-05-02

## 2023-11-22 RX ORDER — BUPROPION HYDROCHLORIDE 150 MG/1
150 TABLET, EXTENDED RELEASE ORAL
COMMUNITY
Start: 2023-11-06

## 2023-11-22 RX ORDER — TOPIRAMATE 50 MG/1
1 TABLET, FILM COATED ORAL 2 TIMES DAILY
COMMUNITY
Start: 2023-06-29

## 2023-11-22 RX ORDER — ERGOCALCIFEROL 1.25 MG/1
50000 CAPSULE ORAL
COMMUNITY
Start: 2023-08-07

## 2023-11-22 RX ORDER — ASPIRIN 81 MG/1
81 TABLET ORAL
COMMUNITY

## 2023-11-22 RX ORDER — IBUPROFEN 800 MG/1
800 TABLET ORAL EVERY 8 HOURS PRN
Qty: 30 TABLET | Refills: 0 | Status: SHIPPED | OUTPATIENT
Start: 2023-11-22 | End: 2023-12-06 | Stop reason: SDUPTHER

## 2023-11-22 RX ORDER — CLONAZEPAM 0.5 MG/1
0.5 TABLET ORAL DAILY PRN
COMMUNITY
Start: 2023-05-02

## 2023-11-22 RX ORDER — METFORMIN HYDROCHLORIDE 500 MG/1
1000 TABLET, EXTENDED RELEASE ORAL
COMMUNITY
Start: 2023-09-23

## 2023-11-22 NOTE — PROGRESS NOTES
Patient ID: Cristel Nevarez  YOB: 1980  MRN: [id number]    Chief Complaint: Pain and Injury of the Left Hand    Referred By: Previous patient    Occupation:       History of Present Illness: Cristel Nevarez is a right-hand dominant 43 y.o. female who presents today with Pain and Injury of the Left Hand    Patient reports to the clinic today with left wrist pain.  She states that 2 weeks ago she hit her wrist on a door frame while rushing.  She went to the Urgent care and got xrays and a wrist brace.  There was no fracture noted in the xray.  She reports the pain as achy and does notice swelling and weakness especially when grasping and with full wrist range of motion.  She states that the wrist brace does help with her pain and wears it all the time.  She has also tried tylenol, NSAIDs, and ice with some relief.      Past Medical History:   Past Medical History:   Diagnosis Date    Diabetes mellitus     Migraine      Past Surgical History:   Procedure Laterality Date    CARPAL TUNNEL RELEASE      DILATION AND CURETTAGE OF UTERUS      OVARY SURGERY      WISDOM TOOTH EXTRACTION       History reviewed. No pertinent family history.  Social History     Socioeconomic History    Marital status:    Tobacco Use    Smoking status: Never   Substance and Sexual Activity    Alcohol use: No    Drug use: No    Sexual activity: Yes     Partners: Female     Medication List with Changes/Refills   New Medications    IBUPROFEN (ADVIL,MOTRIN) 800 MG TABLET    Take 1 tablet (800 mg total) by mouth every 8 (eight) hours as needed for Pain.   Current Medications    ALPRAZOLAM (XANAX) 0.5 MG TABLET    alprazolam Take PRN No date recorded tablet No frequency recorded No route recorded No set duration recorded No set duration amount recorded suspended 0.5 mg    ASPIRIN (ECOTRIN) 81 MG EC TABLET    Take 81 mg by mouth.    BUPROPION (WELLBUTRIN SR) 150 MG TBSR 12 HR TABLET    Take 150 mg  by mouth.    CLONAZEPAM (KLONOPIN) 0.5 MG TABLET    Take 0.5 mg by mouth daily as needed.    ERGOCALCIFEROL (ERGOCALCIFEROL) 50,000 UNIT CAP    Take 50,000 Units by mouth.    ESCITALOPRAM OXALATE (LEXAPRO) 10 MG TABLET    Take 10 mg by mouth every morning.    HYDROXYZINE HCL (ATARAX) 25 MG TABLET    As Needed    METFORMIN (GLUCOPHAGE-XR) 500 MG ER 24HR TABLET    Take 1,000 mg by mouth.    NALTREXONE-BUPROPION (CONTRAVE) 8-90 MG TBSR    2 tablets.    NEBIVOLOL (BYSTOLIC) 10 MG TAB    Take 1 tablet by mouth once daily.    ONDANSETRON (ZOFRAN-ODT) 8 MG TBDL    Take 8 mg by mouth every 12 (twelve) hours as needed.    RIZATRIPTAN (MAXALT-MLT) 10 MG DISINTEGRATING TABLET    Take 10 mg by mouth daily as needed.    TOPIRAMATE (TOPAMAX) 50 MG TABLET    Take 1 tablet by mouth 2 (two) times daily.    TRAZODONE (DESYREL) 50 MG TABLET    Take 50 mg by mouth nightly.    VALACYCLOVIR (VALTREX) 1000 MG TABLET    As Needed     Review of patient's allergies indicates:   Allergen Reactions    Pcn [penicillins] Anaphylaxis    Rocephin [ceftriaxone] Anaphylaxis    Pfizer covid-19 vaccine (eua) [covid-19 vacc,mrna(pfizer)(pf)]     Influenza virus vaccines Other (See Comments)     Severe flu like symptoms - intense generalized myalgias, malaise and fatigue  No site reaction, rash/hives, lip/tongue/throat swelling     Soma [carisoprodol] Rash       Physical Exam:   There is no height or weight on file to calculate BMI.    Physical Exam  Detailed MSK exam:   Ortho/SPM Exam     Left Wrist:  Inspection: Mild swelling in radial wrist  Palpation:  Point bony tenderness at distal radius, mild snuffbox tenderness  Range of motion: 60 deg Flexion         60 deg Extension         90 deg Supination         90 deg Pronation    Strength:  5/5 Flexion    5/5 Extension    5/5 Supination     5/5 Pronation  N/V Exam:  Radial: Normal motor (EPL/thumbs up)              Normal sensory (dorsal hand)   Median: Normal motor (FPL/A-OK)      Normal sensory  (thumb)   Ulnar:  Normal motor (Interossei/scissors-spread)     Normal sensory (5th finger)   LABC: Normal sensory (lateral forearm)   MABC: Normal sensory (medial forearm)   MC: Normal motor (elbow flexion)   Axillary: Normal motor/sensory (deltoid)  Normal radial and ulnar pulses, warm and well perfused with capillary refill < 2 sec       Imaging:  X-Ray Wrist Complete Left  Narrative: EXAM:  XR WRIST COMPLETE 3 VIEWS LEFT    CLINICAL HISTORY:  Left wrist pain.    FINDINGS: 4 views left wrist.  No comparison         No fracture is identified.  Joint alignment is anatomic.  Joint spaces appear relatively well maintained.  Small radiopacity seen on the palmar surface of the distal forearm of unknown clinical significance but could reflect a small foreign body.  Mild soft tissue swelling suspected.  Impression:   No acute radiographic abnormality of the left wrist.    Finalized on: 11/22/2023 8:35 AM By:  Dominick Pacheco MD  [facility]# [id number]      2023-11-22 08:[id number]    [facility]      Relevant imaging results were reviewed and interpreted by me and per my read:  Small cortical irregularity at the lateral distal radial styloid, without any intra-articular extension, or displacement.  Normal alignment of the wrist.  No scapholunate dissociation.  No significant degenerative changes.  Small radiopacity in the superficial soft tissue of the volar forearm.    This was discussed with the patient and / or family today.     Patient Instructions   Assessment:  Cristel Nevarez is a 43 y.o. female with a chief complaint of Pain and Injury of the Left Hand    Encounter Diagnoses   Name Primary?    Other closed fracture of distal end of left radius, initial encounter Yes    Sprain and strain of left wrist     Left wrist pain       Plan:  XR reviewed - small cortical irregularity at the lateral distal radius, possible hairline fracture; no obvious scaphoid abnormalities; no evidence of ligamentous disruption  Labs reviewed - A1c  5.1%, Cr 0.7, , LFTs WNL  History and clinical exam is suggestive of acute left wrist pain due to fairly significant contusion/impact, with significant point tenderness at the distal radius corresponding to small cortical irregularity on radiographs, consistent with a small, hairline fracture.  Also with some strain/sprain pattern pain.    Diagnosis, treatment options, prognosis discussed in detail.    Recommend continued wrist immobilization, for at least the next 2 weeks.    Switched to a more robust, a thumb spica brace today for better immobilization and support.  Keep on at night and throughout the day, removing only for hygiene, and to ice the wrist.  At least 10 minutes were spent sizing, fitting, and educating regarding durable medical equipment today by clinical assistant under direction of Pardeep Jerry MD.  Prescription for ibuprofen 800 mg.  Take 1 tablet, every 6-8 hours, as needed for pain and discomfort.    Alternate with Tylenol for breakthrough pain.    Recommend ice the wrist once or twice a day, for 10-15 minutes, using a barrier between ice and the skin to prevent prospect.    Follow-up:  2 weeks or sooner if there are any problems between now and then.    Thank you for choosing [facility] and Dr. Pardeep Jerry for your orthopedic & sports medicine care. It is our goal to provide you with exceptional care that will help keep you healthy, active, and get you back in the game.    Please do not hesitate to reach out to us via email, phone, or MyChart with any questions, concerns, or feedback.    If you are experiencing pain/discomfort ,or have questions after 5pm and would like to be connected to the [facility] on-call team, please call this number and specify which Sports Medicine provider is treating you: [phone]       A copy of today's visit note has been sent to the referring provider.           Pardeep Jerry  MD  Primary Care Sports Medicine    Disclaimer: This note was prepared using a voice recognition system and is likely to have sound alike errors within the text.

## 2023-11-22 NOTE — PATIENT INSTRUCTIONS
Assessment:  Cristel Nevarez is a 43 y.o. female with a chief complaint of Pain and Injury of the Left Hand    Encounter Diagnoses   Name Primary?    Other closed fracture of distal end of left radius, initial encounter Yes    Sprain and strain of left wrist     Left wrist pain       Plan:  XR reviewed - small cortical irregularity at the lateral distal radius, possible hairline fracture; no obvious scaphoid abnormalities; no evidence of ligamentous disruption  Labs reviewed - A1c 5.1%, Cr 0.7, , LFTs WNL  History and clinical exam is suggestive of acute left wrist pain due to fairly significant contusion/impact, with significant point tenderness at the distal radius corresponding to small cortical irregularity on radiographs, consistent with a small, hairline fracture.  Also with some strain/sprain pattern pain.    Diagnosis, treatment options, prognosis discussed in detail.    Recommend continued wrist immobilization, for at least the next 2 weeks.    Switched to a more robust, a thumb spica brace today for better immobilization and support.  Keep on at night and throughout the day, removing only for hygiene, and to ice the wrist.  At least 10 minutes were spent sizing, fitting, and educating regarding durable medical equipment today by clinical assistant under direction of Pardeep Jerry MD.  Prescription for ibuprofen 800 mg.  Take 1 tablet, every 6-8 hours, as needed for pain and discomfort.    Alternate with Tylenol for breakthrough pain.    Recommend ice the wrist once or twice a day, for 10-15 minutes, using a barrier between ice and the skin to prevent prospect.    Follow-up:  2 weeks or sooner if there are any problems between now and then.    Thank you for choosing [facility] and Dr. Pardeep Jerry for your orthopedic & sports medicine care. It is our goal to provide you with exceptional care that will help keep you healthy, active, and get you back in the  game.    Please do not hesitate to reach out to us via email, phone, or MyChart with any questions, concerns, or feedback.    If you are experiencing pain/discomfort ,or have questions after 5pm and would like to be connected to the [facility] [place] [facility] on-call team, please call this number and specify which Sports Medicine provider is treating you: [phone]

## 2023-12-06 ENCOUNTER — TELEPHONE (OUTPATIENT)
Dept: SPORTS MEDICINE | Facility: CLINIC | Age: 43
End: 2023-12-06

## 2023-12-06 ENCOUNTER — OFFICE VISIT (OUTPATIENT)
Dept: SPORTS MEDICINE | Facility: CLINIC | Age: 43
End: 2023-12-06
Payer: COMMERCIAL

## 2023-12-06 DIAGNOSIS — M65.4 DE QUERVAIN'S TENOSYNOVITIS, LEFT: Primary | ICD-10-CM

## 2023-12-06 DIAGNOSIS — M25.532 LEFT WRIST PAIN: ICD-10-CM

## 2023-12-06 PROCEDURE — 20550 NJX 1 TENDON SHEATH/LIGAMENT: CPT | Mod: 79,LT,S$GLB, | Performed by: STUDENT IN AN ORGANIZED HEALTH CARE EDUCATION/TRAINING PROGRAM

## 2023-12-06 PROCEDURE — 99214 OFFICE O/P EST MOD 30 MIN: CPT | Mod: 25,S$GLB,, | Performed by: STUDENT IN AN ORGANIZED HEALTH CARE EDUCATION/TRAINING PROGRAM

## 2023-12-06 PROCEDURE — 99999 PR PBB SHADOW E&M-EST. PATIENT-LVL III: CPT | Mod: PBBFAC,,, | Performed by: STUDENT IN AN ORGANIZED HEALTH CARE EDUCATION/TRAINING PROGRAM

## 2023-12-06 PROCEDURE — 1159F PR MEDICATION LIST DOCUMENTED IN MEDICAL RECORD: ICD-10-PCS | Mod: CPTII,S$GLB,, | Performed by: STUDENT IN AN ORGANIZED HEALTH CARE EDUCATION/TRAINING PROGRAM

## 2023-12-06 PROCEDURE — 20550 TENDON SHEATH: ICD-10-PCS | Mod: 79,LT,S$GLB, | Performed by: STUDENT IN AN ORGANIZED HEALTH CARE EDUCATION/TRAINING PROGRAM

## 2023-12-06 PROCEDURE — 99214 PR OFFICE/OUTPT VISIT, EST, LEVL IV, 30-39 MIN: ICD-10-PCS | Mod: 25,S$GLB,, | Performed by: STUDENT IN AN ORGANIZED HEALTH CARE EDUCATION/TRAINING PROGRAM

## 2023-12-06 PROCEDURE — 99999 PR PBB SHADOW E&M-EST. PATIENT-LVL III: ICD-10-PCS | Mod: PBBFAC,,, | Performed by: STUDENT IN AN ORGANIZED HEALTH CARE EDUCATION/TRAINING PROGRAM

## 2023-12-06 PROCEDURE — 76942 TENDON SHEATH: ICD-10-PCS | Mod: S$GLB,,, | Performed by: STUDENT IN AN ORGANIZED HEALTH CARE EDUCATION/TRAINING PROGRAM

## 2023-12-06 PROCEDURE — 76942 ECHO GUIDE FOR BIOPSY: CPT | Mod: S$GLB,,, | Performed by: STUDENT IN AN ORGANIZED HEALTH CARE EDUCATION/TRAINING PROGRAM

## 2023-12-06 PROCEDURE — 1160F RVW MEDS BY RX/DR IN RCRD: CPT | Mod: CPTII,S$GLB,, | Performed by: STUDENT IN AN ORGANIZED HEALTH CARE EDUCATION/TRAINING PROGRAM

## 2023-12-06 PROCEDURE — 1159F MED LIST DOCD IN RCRD: CPT | Mod: CPTII,S$GLB,, | Performed by: STUDENT IN AN ORGANIZED HEALTH CARE EDUCATION/TRAINING PROGRAM

## 2023-12-06 PROCEDURE — 1160F PR REVIEW ALL MEDS BY PRESCRIBER/CLIN PHARMACIST DOCUMENTED: ICD-10-PCS | Mod: CPTII,S$GLB,, | Performed by: STUDENT IN AN ORGANIZED HEALTH CARE EDUCATION/TRAINING PROGRAM

## 2023-12-06 RX ORDER — BETAMETHASONE SODIUM PHOSPHATE AND BETAMETHASONE ACETATE 3; 3 MG/ML; MG/ML
6 INJECTION, SUSPENSION INTRA-ARTICULAR; INTRALESIONAL; INTRAMUSCULAR; SOFT TISSUE
Status: DISCONTINUED | OUTPATIENT
Start: 2023-12-06 | End: 2023-12-06 | Stop reason: HOSPADM

## 2023-12-06 RX ORDER — IBUPROFEN 800 MG/1
800 TABLET ORAL EVERY 8 HOURS PRN
Qty: 30 TABLET | Refills: 0 | Status: SHIPPED | OUTPATIENT
Start: 2023-12-06

## 2023-12-06 RX ADMIN — BETAMETHASONE SODIUM PHOSPHATE AND BETAMETHASONE ACETATE 6 MG: 3; 3 INJECTION, SUSPENSION INTRA-ARTICULAR; INTRALESIONAL; INTRAMUSCULAR; SOFT TISSUE at 08:12

## 2023-12-06 NOTE — PROGRESS NOTES
Patient ID: Cristel Nevarez  YOB: 1980  MRN: [id number]    Chief Complaint: Injury of the Left Wrist    Referred By: Previous patient    Occupation:       History of Present Illness: Cristel Nevarez is a right-hand dominant 43 y.o. female who presents today with Injury of the Left Wrist    She was initially evaluated in our office on 11/22/23.   She was diagnosed with left wrist sprain with possible subtle nondisplaced distal radius fracture and treated with a thumb spica brace and ibuprofen 800mg.    Today, she reports no improvement in her wrist pain.  She is been using her wrist brace diligently, but still having significant radial sided wrist pain.  Has noticed pain with extreme flexion of the thumb as well as extreme extension of the thumb.      HPI 11/22/23:  Patient reports to the clinic today with left wrist pain.  She states that 2 weeks ago she hit her wrist on a door frame while rushing.  She went to the Urgent care and got xrays and a wrist brace.  There was no fracture noted in the xray.  She reports the pain as achy and does notice swelling and weakness especially when grasping and with full wrist range of motion.  She states that the wrist brace does help with her pain and wears it all the time.  She has also tried tylenol, NSAIDs, and ice with some relief.      Past Medical History:   Past Medical History:   Diagnosis Date    Diabetes mellitus     Migraine      Past Surgical History:   Procedure Laterality Date    CARPAL TUNNEL RELEASE      DILATION AND CURETTAGE OF UTERUS      OVARY SURGERY      WISDOM TOOTH EXTRACTION       History reviewed. No pertinent family history.  Social History     Socioeconomic History    Marital status:    Tobacco Use    Smoking status: Never   Substance and Sexual Activity    Alcohol use: No    Drug use: No    Sexual activity: Yes     Partners: Female     Medication List with Changes/Refills   Current Medications     ALPRAZOLAM (XANAX) 0.5 MG TABLET    alprazolam Take PRN No date recorded tablet No frequency recorded No route recorded No set duration recorded No set duration amount recorded suspended 0.5 mg    ASPIRIN (ECOTRIN) 81 MG EC TABLET    Take 81 mg by mouth.    BUPROPION (WELLBUTRIN SR) 150 MG TBSR 12 HR TABLET    Take 150 mg by mouth.    CLONAZEPAM (KLONOPIN) 0.5 MG TABLET    Take 0.5 mg by mouth daily as needed.    ERGOCALCIFEROL (ERGOCALCIFEROL) 50,000 UNIT CAP    Take 50,000 Units by mouth.    ESCITALOPRAM OXALATE (LEXAPRO) 10 MG TABLET    Take 10 mg by mouth every morning.    HYDROXYZINE HCL (ATARAX) 25 MG TABLET    As Needed    IBUPROFEN (ADVIL,MOTRIN) 800 MG TABLET    Take 1 tablet (800 mg total) by mouth every 8 (eight) hours as needed for Pain.    METFORMIN (GLUCOPHAGE-XR) 500 MG ER 24HR TABLET    Take 1,000 mg by mouth.    NALTREXONE-BUPROPION (CONTRAVE) 8-90 MG TBSR    2 tablets.    NEBIVOLOL (BYSTOLIC) 10 MG TAB    Take 1 tablet by mouth once daily.    ONDANSETRON (ZOFRAN-ODT) 8 MG TBDL    Take 8 mg by mouth every 12 (twelve) hours as needed.    RIZATRIPTAN (MAXALT-MLT) 10 MG DISINTEGRATING TABLET    Take 10 mg by mouth daily as needed.    TOPIRAMATE (TOPAMAX) 50 MG TABLET    Take 1 tablet by mouth 2 (two) times daily.    TRAZODONE (DESYREL) 50 MG TABLET    Take 50 mg by mouth nightly.    VALACYCLOVIR (VALTREX) 1000 MG TABLET    As Needed     Review of patient's allergies indicates:   Allergen Reactions    Pcn [penicillins] Anaphylaxis    Rocephin [ceftriaxone] Anaphylaxis    Pfizer covid-19 vaccine (eua) [covid-19 vacc,mrna(pfizer)(pf)]     Influenza virus vaccines Other (See Comments)     Severe flu like symptoms - intense generalized myalgias, malaise and fatigue  No site reaction, rash/hives, lip/tongue/throat swelling     Soma [carisoprodol] Rash       Physical Exam:   There is no height or weight on file to calculate BMI.    Physical Exam  Detailed MSK exam:   Ortho/SPM Exam     Left  Wrist:  Inspection: Mild swelling in radial wrist  Palpation:  Point bony tenderness at distal radius, mild snuffbox tenderness  Range of motion: 60 deg Flexion         60 deg Extension         90 deg Supination         90 deg Pronation    Strength:  5/5 Flexion    5/5 Extension    5/5 Supination     5/5 Pronation  Special tests:  Positive Finkelstein's  N/V Exam:  Radial: Normal motor (EPL/thumbs up)              Normal sensory (dorsal hand)   Median: Normal motor (FPL/A-OK)      Normal sensory (thumb)   Ulnar:  Normal motor (Interossei/scissors-spread)     Normal sensory (5th finger)   LABC: Normal sensory (lateral forearm)   MABC: Normal sensory (medial forearm)   MC: Normal motor (elbow flexion)   Axillary: Normal motor/sensory (deltoid)  Normal radial and ulnar pulses, warm and well perfused with capillary refill < 2 sec       Imaging:    No new imaging today.     Patient Instructions   Assessment:  Cristel Nevarez is a 43 y.o. female with a chief complaint of Injury of the Left Wrist    Encounter Diagnoses   Name Primary?    Left wrist pain     De Quervain's tenosynovitis, left Yes      Plan:  Unfortunately, patient has not had much improvement in her symptoms in the past 2 weeks.  Still with quite a bit of radial sided wrist pain today.  More consistent with de Quervain's tenosynovitis, with positive Finkelstein's testing, and effusion and pain with sonopalpation.    We will treat presumptively, with corticosteroid injection to the left 1st dorsal extensor compartment tendon sheath today.    Proper protocols after the injection included: no submerging pools, baths tubs, or hot tubs for 24 hr.  Showering is okay today.  Side effects of the corticosteroid injection can include elevated blood glucose levels and blood pressures, so if you are taking medications for these, please monitor closely, and contact your PCP if any issues.  Red flag symptoms include fever, chills, nausea, vomiting, red, warm,  tender joint at the area of injection.  If you are noticing these symptoms, they may be indicative of an infection, and please seek medical care immediately, either by calling our clinic or going to the emergency room.  Continue to wear the thumb spica wrist brace for the next week or so, then can discontinue if starting to feel better.    If not improving, we will obtain MRI  Can continue ibuprofen 800 mg, as needed    Follow-up:  As needed or sooner if there are any problems between now and then.    Thank you for choosing [facility] and Dr. Pardeep Jerry for your orthopedic & sports medicine care. It is our goal to provide you with exceptional care that will help keep you healthy, active, and get you back in the game.    Please do not hesitate to reach out to us via email, phone, or MyChart with any questions, concerns, or feedback.    If you are experiencing pain/discomfort ,or have questions after 5pm and would like to be connected to the [facility] on-call team, please call this number and specify which Sports Medicine provider is treating you: [phone]       A copy of today's visit note has been sent to the referring provider.           Pardeep Jerry MD  Primary Care Sports Medicine    Disclaimer: This note was prepared using a voice recognition system and is likely to have sound alike errors within the text.

## 2023-12-06 NOTE — PATIENT INSTRUCTIONS
Assessment:  Cristel Nevarez is a 43 y.o. female with a chief complaint of Injury of the Left Wrist    Encounter Diagnoses   Name Primary?    Left wrist pain     De Quervain's tenosynovitis, left Yes      Plan:  Unfortunately, patient has not had much improvement in her symptoms in the past 2 weeks.  Still with quite a bit of radial sided wrist pain today.  More consistent with de Quervain's tenosynovitis, with positive Finkelstein's testing, and effusion and pain with sonopalpation.    We will treat presumptively, with corticosteroid injection to the left 1st dorsal extensor compartment tendon sheath today.    Proper protocols after the injection included: no submerging pools, baths tubs, or hot tubs for 24 hr.  Showering is okay today.  Side effects of the corticosteroid injection can include elevated blood glucose levels and blood pressures, so if you are taking medications for these, please monitor closely, and contact your PCP if any issues.  Red flag symptoms include fever, chills, nausea, vomiting, red, warm, tender joint at the area of injection.  If you are noticing these symptoms, they may be indicative of an infection, and please seek medical care immediately, either by calling our clinic or going to the emergency room.  Continue to wear the thumb spica wrist brace for the next week or so, then can discontinue if starting to feel better.    If not improving, we will obtain MRI  Can continue ibuprofen 800 mg, as needed    Follow-up:  As needed or sooner if there are any problems between now and then.    Thank you for choosing [facility] and Dr. Pardeep Jerry for your orthopedic & sports medicine care. It is our goal to provide you with exceptional care that will help keep you healthy, active, and get you back in the game.    Please do not hesitate to reach out to us via email, phone, or MyChart with any questions, concerns, or feedback.    If you are experiencing  pain/discomfort ,or have questions after 5pm and would like to be connected to the [facility] [place] on-call team, please call this number and specify which Sports Medicine provider is treating you: [phone]

## 2023-12-06 NOTE — TELEPHONE ENCOUNTER
----- Message from Apolonia Franco sent at 12/6/2023  3:41 PM CST -----  Contact: [phone]  Patient is calling in regards to wrist pain she is having. Pt stated that she is needing to know if pain she is experience is normal after the injection. Pt is having pain even when her wrist is at rest. Please call her back at [phone]. Thanks KB

## 2023-12-06 NOTE — PROCEDURES
Tendon Sheath    Date/Time: 12/6/2023 8:40 AM    Performed by: Pardeep Jerry MD  Authorized by: Pardeep Jerry MD    Consent Done?:  Yes (Verbal)  Indications:  Pain  Site marked: the procedure site was marked    Timeout: prior to procedure the correct patient, procedure, and site was verified    Local anesthesia used?: Yes    Anesthesia:  Local infiltration  Local anesthetic:  Lidocaine 1% without epinephrine and topical anesthetic  Anesthetic total (ml):  1    Location:  Wrist  Site:  L first doral compartment  Ultrasonic guidance for needle placement?: Yes    Needle size:  25 G  Medications:  6 mg betamethasone acetate-betamethasone sodium phosphate 6 mg/mL  Patient tolerance:  Patient tolerated the procedure well with no immediate complications    Additional Comments: Ultrasound guidance was used for needle localization. Images were saved and stored for documentation. The appropriate structures were visualized. Dynamic visualization of the needle was continuous throughout the procedures and maintained good position.     We discussed the proper protocols after the injection such as no submerging pools, baths tubs, or hot tubs for 24 hr.  Showering is okay today.  We also discussed that blood sugars can be elevated after an injection and asked patient to properly check their sugars over the next few days and contact their PCP if there are any concerns.  We discussed red flags such as fevers, chills, red, warm, tender joint at the area of injection to please seek medical care immediately.

## 2023-12-06 NOTE — TELEPHONE ENCOUNTER
"Called pt back. She reports increased pain since injection. She denied experiencing any of the "red flag" symptoms discussed at appointment. Explained that it is normal to have some increased pain/discomfort after an injection, but to keep an eye out for signs of infection (fever, chills, nausea, etc per procedure note) and advised she call back if she notices any of those symptoms or continued increased pain/worsening of the pain.  Patient reports that she will continue taking OTC NSAIDS and tylenol for the pain in the meantime.  "

## 2024-06-12 ENCOUNTER — OFFICE VISIT (OUTPATIENT)
Dept: ORTHOPEDICS | Facility: CLINIC | Age: 44
End: 2024-06-12
Payer: COMMERCIAL

## 2024-06-12 VITALS — BODY MASS INDEX: 39.26 KG/M2 | WEIGHT: 229.94 LBS | HEIGHT: 64 IN

## 2024-06-12 DIAGNOSIS — M65.4 RADIAL STYLOID TENOSYNOVITIS (DE QUERVAIN): Primary | ICD-10-CM

## 2024-06-12 PROCEDURE — 99203 OFFICE O/P NEW LOW 30 MIN: CPT | Mod: 25,S$GLB,, | Performed by: ORTHOPAEDIC SURGERY

## 2024-06-12 PROCEDURE — 3008F BODY MASS INDEX DOCD: CPT | Mod: CPTII,S$GLB,, | Performed by: ORTHOPAEDIC SURGERY

## 2024-06-12 PROCEDURE — 20550 NJX 1 TENDON SHEATH/LIGAMENT: CPT | Mod: LT,S$GLB,, | Performed by: ORTHOPAEDIC SURGERY

## 2024-06-12 PROCEDURE — 3044F HG A1C LEVEL LT 7.0%: CPT | Mod: CPTII,S$GLB,, | Performed by: ORTHOPAEDIC SURGERY

## 2024-06-12 PROCEDURE — 1159F MED LIST DOCD IN RCRD: CPT | Mod: CPTII,S$GLB,, | Performed by: ORTHOPAEDIC SURGERY

## 2024-06-12 PROCEDURE — 99999 PR PBB SHADOW E&M-EST. PATIENT-LVL III: CPT | Mod: PBBFAC,,, | Performed by: ORTHOPAEDIC SURGERY

## 2024-06-12 PROCEDURE — 1160F RVW MEDS BY RX/DR IN RCRD: CPT | Mod: CPTII,S$GLB,, | Performed by: ORTHOPAEDIC SURGERY

## 2024-06-12 RX ORDER — TRIAMCINOLONE ACETONIDE 40 MG/ML
40 INJECTION, SUSPENSION INTRA-ARTICULAR; INTRAMUSCULAR
Status: DISCONTINUED | OUTPATIENT
Start: 2024-06-12 | End: 2024-06-12 | Stop reason: HOSPADM

## 2024-06-12 RX ADMIN — TRIAMCINOLONE ACETONIDE 40 MG: 40 INJECTION, SUSPENSION INTRA-ARTICULAR; INTRAMUSCULAR at 07:06

## 2024-06-12 NOTE — PROGRESS NOTES
Subjective:     Patient ID: Cristel Nevarez is a 43 y.o. female.    Chief Complaint: Pain of the Left Wrist      HPI:  The patient is a 43-year-old female with left wrist de Quervain tendonitis.  She had injection for left de Quervain tendonitis by Dr. Jerry 12/06/2023.  She requests reinjection today.  She does have a thumb spica splint    Past Medical History:   Diagnosis Date    Diabetes mellitus     Migraine      Past Surgical History:   Procedure Laterality Date    CARPAL TUNNEL RELEASE      DILATION AND CURETTAGE OF UTERUS      OVARY SURGERY      WISDOM TOOTH EXTRACTION       No family history on file.  Social History     Socioeconomic History    Marital status:    Tobacco Use    Smoking status: Never   Substance and Sexual Activity    Alcohol use: No    Drug use: No    Sexual activity: Yes     Partners: Female     Social Determinants of Health     Financial Resource Strain: Low Risk  (10/4/2023)    Received from [employer] [facility] and Its Subsidiaries and Affiliates, [employer] [facility] and Its Subsidiaries and Affiliates    Overall Financial Resource Strain (CARDIA)     Difficulty of Paying Living Expenses: Not hard at all   Food Insecurity: No Food Insecurity (10/4/2023)    Received from [employer] [facility] and Its Subsidiaries and Affiliates, [employer] [facility] and Its Subsidiaries and Affiliates    Hunger Vital Sign     Worried About Running Out of Food in the Last Year: Never true     Ran Out of Food in the Last Year: Never true   Transportation Needs: No Transportation Needs (10/4/2023)    Received from [employer] [facility] and Its Subsidiaries and Affiliates, [employer] [facility] and Its Subsidiaries and Affiliates    PRAPARE - Transportation     Lack of Transportation (Medical): No     Lack of Transportation  (Non-Medical): No   Physical Activity: Sufficiently Active (10/4/2023)    Received from [facility] and Affiliates, [facility]    Exercise Vital Sign     Days of Exercise per Week: 4 days     Minutes of Exercise per Session: 40 min   Stress: No Stress Concern Present (10/4/2023)    Received from [facility] and Affiliates, [facility]    [facility] of Occupational Health - Occupational Stress Questionnaire     Feeling of Stress : Only a little   Housing Stability: Unknown (10/4/2023)    [facility], [facility] and [facility]    Housing Stability Vital Sign     Unable to Pay for Housing in the Last Year: No     In the last 12 months, was there a time when you did not have a steady place to sleep or slept in a shelter (including now)?: No     Medication List with Changes/Refills   Current Medications    ALPRAZOLAM (XANAX) 0.5 MG TABLET    alprazolam Take PRN No date recorded tablet No frequency recorded No route recorded No set duration recorded No set duration amount recorded suspended 0.5 mg    ASPIRIN (ECOTRIN) 81 MG EC TABLET    Take 81 mg by mouth.    BUPROPION (WELLBUTRIN SR) 150 MG TBSR 12 HR TABLET    Take 150 mg by mouth.    CLONAZEPAM (KLONOPIN) 0.5 MG TABLET    Take 0.5 mg by mouth daily as needed.    ERGOCALCIFEROL (ERGOCALCIFEROL) 50,000 UNIT CAP    Take 50,000 Units by mouth.    ESCITALOPRAM OXALATE (LEXAPRO) 10 MG TABLET    Take 10 mg by mouth every morning.    HYDROXYZINE HCL (ATARAX) 25 MG TABLET    As Needed    IBUPROFEN (ADVIL,MOTRIN) 800 MG TABLET    Take 1 tablet (800 mg total) by  mouth every 8 (eight) hours as needed for Pain.    METFORMIN (GLUCOPHAGE-XR) 500 MG ER 24HR TABLET    Take 1,000 mg by mouth.    NALTREXONE-BUPROPION (CONTRAVE) 8-90 MG TBSR    2 tablets.    NEBIVOLOL (BYSTOLIC) 10 MG TAB    Take 1 tablet by mouth once daily.    ONDANSETRON (ZOFRAN-ODT) 8 MG TBDL    Take 8 mg by mouth every 12 (twelve) hours as needed.    RIZATRIPTAN (MAXALT-MLT) 10 MG DISINTEGRATING TABLET    Take 10 mg by mouth daily as needed.    TOPIRAMATE (TOPAMAX) 50 MG TABLET    Take 1 tablet by mouth 2 (two) times daily.    TRAZODONE (DESYREL) 50 MG TABLET    Take 50 mg by mouth nightly.    VALACYCLOVIR (VALTREX) 1000 MG TABLET    As Needed     Review of patient's allergies indicates:   Allergen Reactions    Pcn [penicillins] Anaphylaxis    Rocephin [ceftriaxone] Anaphylaxis    Pfizer covid-19 vaccine (eua) [covid-19 vacc,mrna(pfizer)(pf)]     Influenza virus vaccines Other (See Comments)     Severe flu like symptoms - intense generalized myalgias, malaise and fatigue  No site reaction, rash/hives, lip/tongue/throat swelling     Soma [carisoprodol] Rash     Review of Systems   Constitutional: Negative for malaise/fatigue.   HENT:  Negative for hearing loss.    Eyes:  Negative for double vision and visual disturbance.   Cardiovascular:  Negative for chest pain.   Respiratory:  Negative for shortness of breath.    Endocrine: Negative for cold intolerance.   Hematologic/Lymphatic: Does not bruise/bleed easily.   Skin:  Negative for poor wound healing and suspicious lesions.   Musculoskeletal:  Negative for gout, joint pain and joint swelling.   Gastrointestinal:  Negative for nausea and vomiting.   Genitourinary:  Negative for dysuria.   Neurological:  Positive for numbness, paresthesias and sensory change.   Psychiatric/Behavioral:  Negative for depression, memory loss and substance abuse. The patient is not nervous/anxious.    Allergic/Immunologic: Negative for persistent infections.       Objective:   Body  mass index is 39.47 kg/m².  There were no vitals filed for this visit.             General    Constitutional: She is oriented to person, place, and time. She appears well-developed and well-nourished. No distress.   HENT:   Head: Normocephalic.   Eyes: EOM are normal.   Pulmonary/Chest: Effort normal.   Neurological: She is oriented to person, place, and time.   Psychiatric: She has a normal mood and affect.         Left Hand/Wrist Exam     Inspection   Scars: Wrist - absent Hand -  absent  Effusion: Wrist - absent Hand -  absent    Pain   Wrist - The patient exhibits pain of the extensory musculature.    Tests   Finkelstein's test: positive      Other     Sensory Exam  Median Distribution: normal  Ulnar Distribution: normal  Radial Distribution: normal    Comments:  The patient has tenderness left wrist 1st dorsal extensor tendon compartment with a positive Finkelstein test          Vascular Exam       Capillary Refill  Left Hand: normal capillary refill          Relevant imaging results reviewed and interpreted by me, discussed with the patient and / or family today radiographs left wrist were normal  Assessment:     Encounter Diagnosis   Name Primary?    Radial styloid tenosynovitis (de quervain) Yes    Left wrist    Plan:     The patient was injected left de Quervain first dorsal extensor tendon compartment with 0.5 cc of Kenalog and 0.5 cc of 2% plain lidocaine.  She will wait at least 3 months between injections                Disclaimer: This note was prepared using a voice recognition system and is likely to have sound alike errors within the text.

## 2024-06-12 NOTE — PROCEDURES
Tendon Sheath    Date/Time: 6/12/2024 7:30 AM    Performed by: Kong Rice MD  Authorized by: Kong Rice MD    Consent Done?:  Yes (Verbal)  Indications:  Pain  Local anesthesia used?: Yes    Local anesthetic:  Lidocaine 2% without epinephrine  Anesthetic total (ml):  0.5    Location:  Wrist  Site:  L first doral compartment  Ultrasonic guidance for needle placement?: No    Needle size:  25 G  Approach:  Radial  Medications:  40 mg triamcinolone acetonide 40 mg/mL

## 2024-10-10 DIAGNOSIS — M25.522 BILATERAL ELBOW JOINT PAIN: Primary | ICD-10-CM

## 2024-10-10 DIAGNOSIS — M25.521 BILATERAL ELBOW JOINT PAIN: Primary | ICD-10-CM

## 2025-01-29 DIAGNOSIS — M24.121 OTHER ARTICULAR CARTILAGE DISORDERS, RIGHT ELBOW: Primary | ICD-10-CM

## 2025-01-30 ENCOUNTER — TELEPHONE (OUTPATIENT)
Dept: ORTHOPEDICS | Facility: CLINIC | Age: 45
End: 2025-01-30
Payer: COMMERCIAL

## 2025-01-30 NOTE — TELEPHONE ENCOUNTER
----- Message from Ephraim Eid sent at 1/30/2025 11:03 AM CST -----  Regarding: FW: Referral  You can schedule with any of the hand providers.  ----- Message -----  From: Honey Kelley  Sent: 1/30/2025  10:54 AM CST  To: [facility] Ortho Clinical Staff  Subject: Referral                                         Rafael,    Provider Colby Smith would like to refer the patient to the Orthopedics department.      The patients diagnosis is: Other articular cartilage disorders, right elbow [M24.121]    The patients referral and records are scanned into media manager.     Please review and contact patient to schedule appointment. If there are no appointments available at this time, please advise and I will inform the referring provider/clinic      Thank you,   Honey Spivey

## 2025-02-04 ENCOUNTER — OFFICE VISIT (OUTPATIENT)
Dept: ORTHOPEDICS | Facility: CLINIC | Age: 45
End: 2025-02-04

## 2025-02-04 DIAGNOSIS — M24.121 OTHER ARTICULAR CARTILAGE DISORDERS, RIGHT ELBOW: ICD-10-CM

## 2025-02-04 DIAGNOSIS — M77.01 MEDIAL EPICONDYLITIS OF ELBOW, RIGHT: Primary | ICD-10-CM

## 2025-02-04 PROCEDURE — 99999 PR PBB SHADOW E&M-EST. PATIENT-LVL III: CPT | Mod: PBBFAC,,, | Performed by: ORTHOPAEDIC SURGERY

## 2025-02-04 NOTE — PROGRESS NOTES
Subjective:     Patient ID: Cristel Nevarez is a 44 y.o. female.    Chief Complaint: No chief complaint on file.      HPI:  The patient is a 44-year-old female with a long history of right elbow medial epicondylitis.  She has had 3 cortisone injections by Dr. Gray without improvement.  She has not tried elbow brace or tenjet procedure.    Past Medical History:   Diagnosis Date    Diabetes mellitus     Migraine      Past Surgical History:   Procedure Laterality Date    CARPAL TUNNEL RELEASE      DILATION AND CURETTAGE OF UTERUS      OVARY SURGERY      WISDOM TOOTH EXTRACTION       No family history on file.  Social History     Socioeconomic History    Marital status:    Tobacco Use    Smoking status: Never   Substance and Sexual Activity    Alcohol use: No    Drug use: No    Sexual activity: Yes     Partners: Female     Social Drivers of Health     Financial Resource Strain: Low Risk  (10/4/2023)    Received from [employer] [facility] and Its Subsidiaries and Affiliates, [facility] [facility] and Its Subsidiaries and Affiliates    Overall Financial Resource Strain (CARDIA)     Difficulty of Paying Living Expenses: Not hard at all   Food Insecurity: No Food Insecurity (10/4/2023)    [facility], [facility] and Its Subsidiaries and Affiliates    Hunger Vital Sign     Worried About Running Out of Food in the Last Year: Never true     Ran Out of Food in the Last Year: Never true   Transportation Needs: No Transportation Needs (10/4/2023)    [facility], [facility] and Its Subsidiaries and Affiliates    PRAPARE - Transportation     Lack of Transportation (Medical): No     Lack of Transportation  (Non-Medical): No   Physical Activity: Sufficiently Active (10/4/2023)    Received from [facility], [facility]    Exercise Vital Sign     Days of Exercise per Week: 4 days     Minutes of Exercise per Session: 40 min   Stress: No Stress Concern Present (10/4/2023)    Received from [facility] and Affiliates, [facility]    [facility] of Occupational Health - Occupational Stress Questionnaire     Feeling of Stress : Only a little   Housing Stability: Unknown (10/4/2023)    [facility], [facility] and [facility]    Housing Stability Vital Sign     Unable to Pay for Housing in the Last Year: No     Unstable Housing in the Last Year: No     Medication List with Changes/Refills   Current Medications    ALPRAZOLAM (XANAX) 0.5 MG TABLET    alprazolam Take PRN No date recorded tablet No frequency recorded No route recorded No set duration recorded No set duration amount recorded suspended 0.5 mg    ASPIRIN (ECOTRIN) 81 MG EC TABLET    Take 81 mg by mouth.    BUPROPION (WELLBUTRIN SR) 150 MG TBSR 12 HR TABLET    Take 150 mg by mouth.    CLONAZEPAM (KLONOPIN) 0.5 MG TABLET    Take 0.5 mg by mouth daily as needed.    ERGOCALCIFEROL (ERGOCALCIFEROL) 50,000 UNIT CAP    Take 50,000 Units by mouth.    ESCITALOPRAM OXALATE (LEXAPRO) 10 MG TABLET    Take 10 mg by mouth every morning.    HYDROXYZINE HCL (ATARAX) 25 MG TABLET    As Needed    IBUPROFEN (ADVIL,MOTRIN) 800 MG TABLET    Take 1 tablet (800 mg total) by mouth every 8 (eight) hours as needed for Pain.    METFORMIN (GLUCOPHAGE-XR) 500 MG ER 24HR  TABLET    Take 1,000 mg by mouth.    NALTREXONE-BUPROPION (CONTRAVE) 8-90 MG TBSR    2 tablets.    NEBIVOLOL (BYSTOLIC) 10 MG TAB    Take 1 tablet by mouth once daily.    ONDANSETRON (ZOFRAN-ODT) 8 MG TBDL    Take 8 mg by mouth every 12 (twelve) hours as needed.    RIZATRIPTAN (MAXALT-MLT) 10 MG DISINTEGRATING TABLET    Take 10 mg by mouth daily as needed.    TOPIRAMATE (TOPAMAX) 50 MG TABLET    Take 1 tablet by mouth 2 (two) times daily.    TRAZODONE (DESYREL) 50 MG TABLET    Take 50 mg by mouth nightly.    VALACYCLOVIR (VALTREX) 1000 MG TABLET    As Needed     Review of patient's allergies indicates:   Allergen Reactions    Pcn [penicillins] Anaphylaxis    Rocephin [ceftriaxone] Anaphylaxis    Pfizer covid-19 vaccine (eua) [covid-19 vacc,mrna(pfizer)(pf)]     Influenza virus vaccines Other (See Comments)     Severe flu like symptoms - intense generalized myalgias, malaise and fatigue  No site reaction, rash/hives, lip/tongue/throat swelling     Soma [carisoprodol] Rash     Review of Systems   Constitutional: Negative for malaise/fatigue.   HENT:  Negative for hearing loss.    Eyes:  Negative for double vision and visual disturbance.   Cardiovascular:  Negative for chest pain.   Respiratory:  Negative for shortness of breath.    Endocrine: Negative for cold intolerance.   Hematologic/Lymphatic: Does not bruise/bleed easily.   Skin:  Negative for poor wound healing and suspicious lesions.   Musculoskeletal:  Negative for gout, joint pain and joint swelling.   Gastrointestinal:  Negative for nausea and vomiting.   Genitourinary:  Negative for dysuria.   Neurological:  Negative for numbness, paresthesias and sensory change.   Psychiatric/Behavioral:  Negative for depression, memory loss and substance abuse. The patient is not nervous/anxious.    Allergic/Immunologic: Negative for persistent infections.       Objective:   There is no height or weight on file to calculate BMI.  There were no vitals filed for this visit.              General    Constitutional: She is oriented to person, place, and time. She appears well-developed and well-nourished. No distress.   HENT:   Head: Normocephalic.   Eyes: EOM are normal.   Pulmonary/Chest: Effort normal.   Neurological: She is oriented to person, place, and time.   Psychiatric: She has a normal mood and affect.             Right Hand/Wrist Exam     Other     Neuorologic Exam    Median Distribution: normal  Ulnar Distribution: normal  Radial Distribution: normal      Right Elbow Exam     Inspection   Scars: absent  Effusion: absent    Pain   The patient exhibits pain of the medial epicondyle    Other   Sensation: normal    Comments:  There is pain well localized right elbow medial epicondyle with pain on resisted wrist and finger flexion          Vascular Exam       Capillary Refill  Right Hand: normal capillary refill          Relevant imaging results reviewed and interpreted by me, discussed with the patient and / or family today MRI scan right elbow showed only common extensor tendon partial tear in the area she is asymptomatic  Assessment:     Medial epicondylitis right elbow     Plan:     The patient was referred to Dr. Mann for consideration of tenjet procedure.  If she is no better after that I may try an injection.  She was given a tennis elbow brace and told to put the pad medial                Disclaimer: This note was prepared using a voice recognition system and is likely to have sound alike errors within the text.